# Patient Record
(demographics unavailable — no encounter records)

---

## 2025-06-11 NOTE — HISTORY OF PRESENT ILLNESS
[FreeTextEntry1] : Pleasant 42-year-old gentleman presents for his first postoperative visit.  The patient is 12 days status post 3 quadrant excisional hemorrhoidectomy for very symptomatic mixed hemorrhoids.  He states that he is definitely feeling better with less pain on a daily basis.  He does not require narcotic pain medications.  He is having less bleeding and less prolapse.  He still describes that his stool is somewhat firm despite stool softeners.  Inspection of the anorectal area reveals that his wounds are clean and slowly healing.  There is no evidence of an undrained collection.  Patient was advised to be sure to drink 6 glasses of water a day and he used a ground fiber supplement daily in addition to the stool softeners.  I will reassess his wounds in 2 weeks.

## 2025-06-25 NOTE — HISTORY OF PRESENT ILLNESS
[FreeTextEntry1] : 42-year-old  gentleman who presents for second postoperative visit.  The patient is approximately one  month status post 3 quadrant excisional hemorrhoidectomy for complex symptomatic mixed hemorrhoids.  Patient feels and looks well with significant improvement in his postoperative incisional pain.  Patient is moving his bowels and has no further bleeding.  The drainage is also minimal at this point.  Inspection of the anorectal area reveals that his hemorrhoidectomy sites are healing nicely.  A digital examination was performed to ensure that no stricture is forming.  He is to continue the same wound care and I will see him in approximately 2 weeks.

## 2025-07-18 NOTE — HISTORY OF PRESENT ILLNESS
[FreeTextEntry1] : Very pleasant 42-year-old gentleman who presents for postoperative visit.  The patient is approximately 6 weeks status post 3 quadrant excisional hemorrhoidectomy.  He states that he is markedly improved having minimal to no pain.  He is moving his bowels with no difficulty.  He denies rectal bleeding or significant ongoing drainage.  The patient was examined in the left lateral decubitus position.  Inspection of the anus reveals that his hemorrhoidectomy wounds are well-healed.  I initially performed a digital examination with my fifth digit and then with the index finger and there is no significant stricture present.  I reminded him to be sure to drink plenty of fluids and take a fiber supplement daily.  I also reminded him about colonoscopies starting at age 45